# Patient Record
Sex: MALE | Race: WHITE | NOT HISPANIC OR LATINO
[De-identification: names, ages, dates, MRNs, and addresses within clinical notes are randomized per-mention and may not be internally consistent; named-entity substitution may affect disease eponyms.]

---

## 2021-12-13 PROBLEM — Z00.00 ENCOUNTER FOR PREVENTIVE HEALTH EXAMINATION: Status: ACTIVE | Noted: 2021-12-13

## 2021-12-15 ENCOUNTER — APPOINTMENT (OUTPATIENT)
Dept: NEUROLOGY | Facility: CLINIC | Age: 41
End: 2021-12-15

## 2022-06-01 ENCOUNTER — APPOINTMENT (OUTPATIENT)
Dept: HEART AND VASCULAR | Facility: CLINIC | Age: 42
End: 2022-06-01

## 2023-05-31 ENCOUNTER — APPOINTMENT (OUTPATIENT)
Dept: NEUROLOGY | Facility: CLINIC | Age: 43
End: 2023-05-31
Payer: MEDICAID

## 2023-05-31 VITALS
TEMPERATURE: 96.1 F | HEART RATE: 72 BPM | WEIGHT: 163 LBS | SYSTOLIC BLOOD PRESSURE: 147 MMHG | OXYGEN SATURATION: 96 % | DIASTOLIC BLOOD PRESSURE: 91 MMHG | HEIGHT: 74 IN | BODY MASS INDEX: 20.92 KG/M2

## 2023-05-31 DIAGNOSIS — F12.91 CANNABIS USE, UNSPECIFIED, IN REMISSION: ICD-10-CM

## 2023-05-31 DIAGNOSIS — Z86.79 PERSONAL HISTORY OF OTHER DISEASES OF THE CIRCULATORY SYSTEM: ICD-10-CM

## 2023-05-31 DIAGNOSIS — Z81.8 FAMILY HISTORY OF OTHER MENTAL AND BEHAVIORAL DISORDERS: ICD-10-CM

## 2023-05-31 DIAGNOSIS — Z86.59 PERSONAL HISTORY OF OTHER MENTAL AND BEHAVIORAL DISORDERS: ICD-10-CM

## 2023-05-31 DIAGNOSIS — R41.9 UNSPECIFIED SYMPTOMS AND SIGNS INVOLVING COGNITIVE FUNCTIONS AND AWARENESS: ICD-10-CM

## 2023-05-31 DIAGNOSIS — Z56.0 UNEMPLOYMENT, UNSPECIFIED: ICD-10-CM

## 2023-05-31 DIAGNOSIS — Z87.898 PERSONAL HISTORY OF OTHER SPECIFIED CONDITIONS: ICD-10-CM

## 2023-05-31 DIAGNOSIS — Z86.69 PERSONAL HISTORY OF OTHER DISEASES OF THE NERVOUS SYSTEM AND SENSE ORGANS: ICD-10-CM

## 2023-05-31 PROCEDURE — 99204 OFFICE O/P NEW MOD 45 MIN: CPT

## 2023-05-31 SDOH — ECONOMIC STABILITY - INCOME SECURITY: UNEMPLOYMENT, UNSPECIFIED: Z56.0

## 2023-06-01 ENCOUNTER — TRANSCRIPTION ENCOUNTER (OUTPATIENT)
Age: 43
End: 2023-06-01

## 2023-06-01 PROBLEM — Z86.59 HISTORY OF DEPRESSION: Status: RESOLVED | Noted: 2023-05-31 | Resolved: 2023-06-01

## 2023-06-01 PROBLEM — Z86.79 HISTORY OF MITRAL VALVE PROLAPSE: Status: RESOLVED | Noted: 2023-05-31 | Resolved: 2023-06-01

## 2023-06-01 PROBLEM — F12.91 HISTORY OF MARIJUANA USE: Status: ACTIVE | Noted: 2023-05-31

## 2023-06-01 PROBLEM — Z81.8 FAMILY HISTORY OF MENTAL DISORDER: Status: ACTIVE | Noted: 2023-05-31

## 2023-06-01 PROBLEM — Z86.69 HISTORY OF COMPLEX PARTIAL EPILEPSY: Status: RESOLVED | Noted: 2023-05-31 | Resolved: 2023-06-01

## 2023-06-01 PROBLEM — Z87.898 HISTORY OF SHORT TERM MEMORY LOSS: Status: RESOLVED | Noted: 2023-05-31 | Resolved: 2023-06-01

## 2023-06-01 PROBLEM — Z56.0 UNEMPLOYED: Status: ACTIVE | Noted: 2023-05-31

## 2023-06-01 PROBLEM — Z86.59 HISTORY OF ANXIETY DISORDER: Status: RESOLVED | Noted: 2023-05-31 | Resolved: 2023-06-01

## 2023-06-01 RX ORDER — PNV NO.95/FERROUS FUM/FOLIC AC 28MG-0.8MG
TABLET ORAL DAILY
Refills: 0 | Status: ACTIVE | COMMUNITY

## 2023-06-01 RX ORDER — OMEPRAZOLE 40 MG/1
40 CAPSULE, DELAYED RELEASE ORAL
Qty: 30 | Refills: 2 | Status: ACTIVE | COMMUNITY

## 2023-06-01 NOTE — HISTORY OF PRESENT ILLNESS
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  MRI at that time was normal.  EEG at that time showed left > right midtemporal dysfunction without definite epileptiform discharges.  He was not started on medications.\par \par In 2007 after moving the [place] he established care with Dr. Andrew and had an EEG that showed left temporal slowing and spikes.  He was initially started on Keppra 500 mg BID but stopped it as he felt like he was having side effects.  Recommendation was made to switch to Depakote, which he did not try.  He later saw a different neurologist who did another EEG which was normal and did not think he had epilepsy.\par \par Today he reports that he is feeling very unwell, anxiety, depressed.  He is having frequent episodes of "spacing out" (has difficulty describing in poor detail). Memory is poor and seems to be worsening.

## 2023-06-01 NOTE — REASON FOR VISIT
[Initial Eval - Existing Diagnosis] : an initial evaluation of an existing diagnosis [FreeTextEntry1] : epilepsy

## 2023-06-01 NOTE — DATA REVIEWED
[de-identified] : MRI 1998 reportedly normal [de-identified] : EEG 1997 moderate disturbance of cerebral activity over both hemispheres maximal in the mid to posterior temporal regions, left > right\par EEG 2008 left temporal slowing and spikes

## 2023-06-01 NOTE — ASSESSMENT
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

## 2023-06-06 ENCOUNTER — APPOINTMENT (OUTPATIENT)
Dept: NEUROLOGY | Facility: CLINIC | Age: 43
End: 2023-06-06

## 2023-06-08 ENCOUNTER — APPOINTMENT (OUTPATIENT)
Dept: NEUROLOGY | Facility: CLINIC | Age: 43
End: 2023-06-08

## 2023-06-09 ENCOUNTER — OUTPATIENT (OUTPATIENT)
Dept: OUTPATIENT SERVICES | Facility: HOSPITAL | Age: 43
LOS: 1 days | End: 2023-06-09

## 2023-06-09 ENCOUNTER — APPOINTMENT (OUTPATIENT)
Dept: MRI IMAGING | Facility: CLINIC | Age: 43
End: 2023-06-09
Payer: MEDICAID

## 2023-06-09 PROCEDURE — 70553 MRI BRAIN STEM W/O & W/DYE: CPT | Mod: 26

## 2023-06-09 PROCEDURE — 76377 3D RENDER W/INTRP POSTPROCES: CPT | Mod: 26

## 2023-06-20 ENCOUNTER — APPOINTMENT (OUTPATIENT)
Dept: NEUROLOGY | Facility: CLINIC | Age: 43
End: 2023-06-20

## 2023-06-21 ENCOUNTER — APPOINTMENT (OUTPATIENT)
Dept: NEUROLOGY | Facility: CLINIC | Age: 43
End: 2023-06-21
Payer: MEDICAID

## 2023-06-21 PROCEDURE — 95819 EEG AWAKE AND ASLEEP: CPT

## 2023-06-22 ENCOUNTER — APPOINTMENT (OUTPATIENT)
Dept: NEUROLOGY | Facility: CLINIC | Age: 43
End: 2023-06-22

## 2023-06-22 PROCEDURE — 95708 EEG WO VID EA 12-26HR UNMNTR: CPT

## 2023-06-23 ENCOUNTER — APPOINTMENT (OUTPATIENT)
Dept: NEUROLOGY | Facility: CLINIC | Age: 43
End: 2023-06-23

## 2023-06-23 PROCEDURE — 95700 EEG CONT REC W/VID EEG TECH: CPT

## 2023-06-23 PROCEDURE — 95721 EEG PHY/QHP>36<60 HR W/O VID: CPT

## 2023-06-23 PROCEDURE — 95708 EEG WO VID EA 12-26HR UNMNTR: CPT

## 2023-07-03 ENCOUNTER — NON-APPOINTMENT (OUTPATIENT)
Age: 43
End: 2023-07-03

## 2023-07-24 ENCOUNTER — APPOINTMENT (OUTPATIENT)
Dept: NEUROLOGY | Facility: CLINIC | Age: 43
End: 2023-07-24
Payer: MEDICAID

## 2023-07-24 PROCEDURE — 99214 OFFICE O/P EST MOD 30 MIN: CPT | Mod: 95

## 2023-07-24 NOTE — DATA REVIEWED
[de-identified] : MRI brain 6/22/2023 independently reviewed, unremarkable [de-identified] : Routine/ambulatory EEG June 2023 normal

## 2023-07-24 NOTE — ASSESSMENT
[FreeTextEntry1] : 43-year-old man with anxiety, depression, prior diagnosis of left temporal epilepsy, frequent "spaced out" sensation without discrete episodes of impaired awareness, worsening memory, and normal EEG and MRI.  I am not certain whether this is due to temporal lobe epilepsy or another neurologic or psychiatric disorder.\par \par -Neuropsych testing -- if deficits are primarily temporal this would increase my suspicion for epilepsy\par -PET scan to assess for temporal lobe hypometabolism\par \par RTC 3-4 months\par \par

## 2023-07-24 NOTE — REASON FOR VISIT
[Home] : at home, [unfilled] , at the time of the visit. [Medical Office: ([facility])___] : at the medical office located in  [Patient] : the patient [Self] : self [Follow-Up: _____] : a [unfilled] follow-up visit

## 2023-07-24 NOTE — HISTORY OF PRESENT ILLNESS
[FreeTextEntry1] : 43-year-old man with possible temporal lobe epilepsy presenting for follow up. \par MRI and routine/ambulatory EEG were completed since last visit and were both unremarkable.\par Continues to feel "spaced out" most of the time, but does not think he has discrete episodes of impaired awareness.  Also continues to hyperfocus on the edges of objects, sheets of paper, etc.  Feels like his memory is continuing to get worse.

## 2023-07-24 NOTE — REASON FOR VISIT
[Home] : at home, [unfilled] , at the time of the visit. [Medical Office: ([facility])___] : at the medical office located in  [Patient] : the patient [Self] : self [Follow-Up: _____] : a [unfilled] follow-up visit

## 2023-08-24 ENCOUNTER — RESULT REVIEW (OUTPATIENT)
Age: 43
End: 2023-08-24

## 2023-08-24 ENCOUNTER — OUTPATIENT (OUTPATIENT)
Dept: OUTPATIENT SERVICES | Facility: HOSPITAL | Age: 43
LOS: 1 days | End: 2023-08-24
Payer: MEDICAID

## 2023-08-24 ENCOUNTER — APPOINTMENT (OUTPATIENT)
Dept: NUCLEAR MEDICINE | Facility: HOSPITAL | Age: 43
End: 2023-08-24
Payer: MEDICAID

## 2023-08-24 LAB — GLUCOSE BLDC GLUCOMTR-MCNC: 96 MG/DL — SIGNIFICANT CHANGE UP (ref 70–99)

## 2023-08-24 PROCEDURE — 78608 BRAIN IMAGING (PET): CPT

## 2023-08-24 PROCEDURE — 78999 UNLISTED MISC PX DX NUC MED: CPT

## 2023-08-24 PROCEDURE — 78608 BRAIN IMAGING (PET): CPT | Mod: 26

## 2023-08-24 PROCEDURE — 78999 UNLISTED MISC PX DX NUC MED: CPT | Mod: 26

## 2023-08-24 PROCEDURE — A9552: CPT

## 2023-08-24 PROCEDURE — 82962 GLUCOSE BLOOD TEST: CPT

## 2023-08-25 ENCOUNTER — TRANSCRIPTION ENCOUNTER (OUTPATIENT)
Age: 43
End: 2023-08-25

## 2023-08-29 ENCOUNTER — APPOINTMENT (OUTPATIENT)
Dept: NUCLEAR MEDICINE | Facility: IMAGING CENTER | Age: 43
End: 2023-08-29

## 2023-09-08 ENCOUNTER — TRANSCRIPTION ENCOUNTER (OUTPATIENT)
Age: 43
End: 2023-09-08

## 2023-09-20 ENCOUNTER — APPOINTMENT (OUTPATIENT)
Dept: NEUROLOGY | Facility: CLINIC | Age: 43
End: 2023-09-20
Payer: MEDICAID

## 2023-09-20 ENCOUNTER — NON-APPOINTMENT (OUTPATIENT)
Age: 43
End: 2023-09-20

## 2023-09-20 PROCEDURE — 90791 PSYCH DIAGNOSTIC EVALUATION: CPT

## 2023-09-20 PROCEDURE — 96132 NRPSYC TST EVAL PHYS/QHP 1ST: CPT

## 2023-09-20 PROCEDURE — 96133 NRPSYC TST EVAL PHYS/QHP EA: CPT

## 2023-09-20 PROCEDURE — 96139 PSYCL/NRPSYC TST TECH EA: CPT

## 2023-09-20 PROCEDURE — 96138 PSYCL/NRPSYC TECH 1ST: CPT

## 2023-09-26 ENCOUNTER — TRANSCRIPTION ENCOUNTER (OUTPATIENT)
Age: 43
End: 2023-09-26

## 2023-10-16 ENCOUNTER — NON-APPOINTMENT (OUTPATIENT)
Age: 43
End: 2023-10-16

## 2023-10-16 ENCOUNTER — TRANSCRIPTION ENCOUNTER (OUTPATIENT)
Age: 43
End: 2023-10-16

## 2023-10-23 ENCOUNTER — TRANSCRIPTION ENCOUNTER (OUTPATIENT)
Age: 43
End: 2023-10-23

## 2023-10-24 ENCOUNTER — TRANSCRIPTION ENCOUNTER (OUTPATIENT)
Age: 43
End: 2023-10-24

## 2023-12-06 ENCOUNTER — APPOINTMENT (OUTPATIENT)
Dept: ORTHOPEDIC SURGERY | Facility: CLINIC | Age: 43
End: 2023-12-06
Payer: MEDICAID

## 2023-12-06 VITALS
DIASTOLIC BLOOD PRESSURE: 77 MMHG | SYSTOLIC BLOOD PRESSURE: 123 MMHG | WEIGHT: 170 LBS | HEART RATE: 63 BPM | BODY MASS INDEX: 21.82 KG/M2 | HEIGHT: 74 IN | OXYGEN SATURATION: 95 % | TEMPERATURE: 97.8 F

## 2023-12-06 DIAGNOSIS — Z01.818 ENCOUNTER FOR OTHER PREPROCEDURAL EXAMINATION: ICD-10-CM

## 2023-12-06 PROCEDURE — 99214 OFFICE O/P EST MOD 30 MIN: CPT

## 2023-12-07 PROBLEM — Z01.818 PREOP TESTING: Status: ACTIVE | Noted: 2023-12-06

## 2023-12-07 LAB
MRSA SPEC QL CULT: NEGATIVE
STAPH AUREUS (SA): POSITIVE

## 2023-12-19 ENCOUNTER — NON-APPOINTMENT (OUTPATIENT)
Age: 43
End: 2023-12-19

## 2023-12-20 ENCOUNTER — NON-APPOINTMENT (OUTPATIENT)
Age: 43
End: 2023-12-20

## 2023-12-21 DIAGNOSIS — Z22.321 CARRIER OR SUSPECTED CARRIER OF METHICILLIN SUSCEPTIBLE STAPHYLOCOCCUS AUREUS: ICD-10-CM

## 2023-12-21 RX ORDER — MUPIROCIN 20 MG/G
2 OINTMENT TOPICAL
Qty: 1 | Refills: 0 | Status: ACTIVE | COMMUNITY
Start: 2023-12-21 | End: 1900-01-01

## 2024-02-01 ENCOUNTER — APPOINTMENT (OUTPATIENT)
Dept: ORTHOPEDIC SURGERY | Facility: HOSPITAL | Age: 44
End: 2024-02-01

## 2024-02-27 ENCOUNTER — APPOINTMENT (OUTPATIENT)
Dept: ORTHOPEDIC SURGERY | Facility: CLINIC | Age: 44
End: 2024-02-27
Payer: MEDICAID

## 2024-02-27 DIAGNOSIS — M54.16 RADICULOPATHY, LUMBAR REGION: ICD-10-CM

## 2024-02-27 DIAGNOSIS — M51.26 OTHER INTERVERTEBRAL DISC DISPLACEMENT, LUMBAR REGION: ICD-10-CM

## 2024-02-27 PROCEDURE — 99214 OFFICE O/P EST MOD 30 MIN: CPT

## 2024-02-27 NOTE — PHYSICAL EXAM
[de-identified] : MRI lumbar spine without contrast 11/16/23 (my read): L5/S1 large right paracentral disc herniation compressing the right S1 nerve root  XR lumbar AP/lateral 10/20/23 (my read): L5/S1 mild/moderate disc degeneration, no spondylolisthesis, no acute fractures.   [de-identified] : Physical Exam:  General: patient is well developed, well nourished, in no acute distress, alert and oriented x 3.  Mood and affect: normal  Respiratory: no respiratory distress noted  Skin: no scars over spine, skin intact, no erythema, increased warmth  Alignment: The spine is well compensated in the coronal and sagittal plane.  Gait: The patient is able to toe walk and heel walk without difficulty. The patient is able to tandem gait without difficulty.  Palpation: no tenderness to palpation spine or paraspinal region  Range of motion: Lumbar spine ROM is full  Neurologic Exam:  Motor: Manual Muscle testing in the lower extremities is 5 out of 5 in all muscle groups. There is no evidence of muscular atrophy in the lower extremities.  Sensory: Sensation to light touch is grossly intact in the lower extremities  Reflexes: DTR are present and symmetric throughout, no clonus, plantar responses are flexor  Hip Exam: No pain with internal or external rotation of hips bilaterally  Special tests: positive straight leg test on the right. Cross straight leg test negative. BRIAN test negative  Vascular: Examination of the peripheral vascular system demonstrates no evidence of congestion or edema. no evidence of lymphedema bilateral lower extremities, pulses are present and symmetric in both lower extremities.

## 2024-02-27 NOTE — HISTORY OF PRESENT ILLNESS
[de-identified] : Follow up 2/27/24: Patient presents for follow up. He was scheduled for L5/S1 microdiscectomy, but needs to delay as he needs dental work done preoperatively. Reports progression of low back pain and right sided lower extremity radiating pain. No lower extremity weakness. No new neurologic symptoms.  Initial 12/6/23 43M was referred by his PCP, Dr. Quintero, he reports right-sided low back pain radiating posteriorly down his right lower extremity to his foot for approximately 6 months. Non traumatic onset. Pain is now predominantely localized to his right buttock and radiates down his right lower extremity to his butt. Pain ranges from a 4 to 8/10 severity and is significantly disabling at this time. Pt has taken oral medication including gabapentin without any significant relief. He's also done home exercises and stretching without any significant relief.

## 2024-02-27 NOTE — DISCUSSION/SUMMARY
[de-identified] : Diagnosis: large right paracentral L5/S1 disc herniation, severe lumbar radiculopathy  Discussed my findings with the patient. Mr. Velasquez has been suffering from severe pain that originates in his right buttock and radiates posteriorly down his right lower extremity to his foot. Symptoms have progressed over the past 2 months. Repeat clinical exam today showed patient neurologically intact.  The patient is a candidate for a right L5/S1 hemilaminotomy, microdiscectomy, possible insertion of barricaid annular closure device. The decision to use the barricaid annular closure device will be made intraoperatively, depending on the size and location of the annular defect. Further non operative treatment would include pain management. Risks, benefits, alternatives were discussed with the patient at great length, including but not limited to, bleeding, infection, persistent neurologic deficit, worsening pain, worsening neurologic status, cerebrospinal fluid leak, medical complications (including but not limited to cardiac, pulmonary, renal), and the need for further surgery. We discussed that there is no guarantee of return to pain free status or normal neurologic function. The patient asked a number of cogent questions. All questions were answered. The patient demonstrated understanding of the risks, benefits, and alternatives. The patient requires dental work prior to proceeding with surgery. He has a follow up visit with his dentist 4/4/24. Ok to defer surgery until dental work completed as patient is neurologically intact on exam. Patient advised of red flag symptoms, will follow up sooner if symptoms arise. Surgical date will be determined based on dentist's recommendations for antibiotics post-dental procedure.

## 2024-03-14 ENCOUNTER — NON-APPOINTMENT (OUTPATIENT)
Age: 44
End: 2024-03-14

## 2024-03-14 NOTE — DATA REVIEWED
18 [de-identified] : MRI brain 6/22/2023 independently reviewed, unremarkable [de-identified] : Routine/ambulatory EEG June 2023 normal